# Patient Record
Sex: MALE | Race: WHITE | HISPANIC OR LATINO | Employment: FULL TIME | URBAN - METROPOLITAN AREA
[De-identification: names, ages, dates, MRNs, and addresses within clinical notes are randomized per-mention and may not be internally consistent; named-entity substitution may affect disease eponyms.]

---

## 2019-03-20 ENCOUNTER — OCCUPATIONAL HEALTH (OUTPATIENT)
Dept: URGENT CARE | Facility: CLINIC | Age: 29
End: 2019-03-20

## 2019-03-20 DIAGNOSIS — Z02.83 ENCOUNTER FOR DRUG SCREENING: Primary | ICD-10-CM

## 2019-03-20 LAB
CTP QC/QA: YES
POC 10 PANEL DRUG SCREEN: NORMAL

## 2019-03-20 PROCEDURE — 80305 POCT RAPID DRUG SCREEN 10 PANEL: ICD-10-PCS | Mod: QW,S$GLB,, | Performed by: PHYSICIAN ASSISTANT

## 2019-03-20 PROCEDURE — 80305 DRUG TEST PRSMV DIR OPT OBS: CPT | Mod: QW,S$GLB,, | Performed by: PHYSICIAN ASSISTANT

## 2025-01-08 ENCOUNTER — HOSPITAL ENCOUNTER (EMERGENCY)
Facility: OTHER | Age: 35
Discharge: HOME | End: 2025-01-08
Attending: EMERGENCY MEDICINE

## 2025-01-08 VITALS
SYSTOLIC BLOOD PRESSURE: 154 MMHG | TEMPERATURE: 99 F | HEART RATE: 101 BPM | DIASTOLIC BLOOD PRESSURE: 91 MMHG | OXYGEN SATURATION: 100 % | RESPIRATION RATE: 18 BRPM

## 2025-01-08 DIAGNOSIS — S53.105A CLOSED DISLOCATION OF LEFT ELBOW: ICD-10-CM

## 2025-01-08 DIAGNOSIS — S42.402A ELBOW FRACTURE, LEFT: ICD-10-CM

## 2025-01-08 DIAGNOSIS — R60.9 SWELLING: ICD-10-CM

## 2025-01-08 LAB
ANION GAP SERPL CALC-SCNC: 10 MMOL/L (ref 8–16)
BASOPHILS # BLD AUTO: 0.05 K/UL (ref 0–0.2)
BASOPHILS NFR BLD: 0.5 % (ref 0–1.9)
BUN SERPL-MCNC: 16 MG/DL (ref 6–20)
CALCIUM SERPL-MCNC: 9.9 MG/DL (ref 8.7–10.5)
CHLORIDE SERPL-SCNC: 104 MMOL/L (ref 95–110)
CO2 SERPL-SCNC: 23 MMOL/L (ref 23–29)
CREAT SERPL-MCNC: 1.1 MG/DL (ref 0.5–1.4)
DIFFERENTIAL METHOD BLD: ABNORMAL
EOSINOPHIL # BLD AUTO: 0.3 K/UL (ref 0–0.5)
EOSINOPHIL NFR BLD: 2.9 % (ref 0–8)
ERYTHROCYTE [DISTWIDTH] IN BLOOD BY AUTOMATED COUNT: 12.6 % (ref 11.5–14.5)
EST. GFR  (NO RACE VARIABLE): >60 ML/MIN/1.73 M^2
GLUCOSE SERPL-MCNC: 66 MG/DL (ref 70–110)
HCT VFR BLD AUTO: 44.7 % (ref 40–54)
HGB BLD-MCNC: 15.5 G/DL (ref 14–18)
IMM GRANULOCYTES # BLD AUTO: 0.05 K/UL (ref 0–0.04)
IMM GRANULOCYTES NFR BLD AUTO: 0.5 % (ref 0–0.5)
LYMPHOCYTES # BLD AUTO: 2.9 K/UL (ref 1–4.8)
LYMPHOCYTES NFR BLD: 31.8 % (ref 18–48)
MCH RBC QN AUTO: 31.1 PG (ref 27–31)
MCHC RBC AUTO-ENTMCNC: 34.7 G/DL (ref 32–36)
MCV RBC AUTO: 90 FL (ref 82–98)
MONOCYTES # BLD AUTO: 0.8 K/UL (ref 0.3–1)
MONOCYTES NFR BLD: 8.7 % (ref 4–15)
NEUTROPHILS # BLD AUTO: 5.1 K/UL (ref 1.8–7.7)
NEUTROPHILS NFR BLD: 55.6 % (ref 38–73)
NRBC BLD-RTO: 0 /100 WBC
PLATELET # BLD AUTO: 264 K/UL (ref 150–450)
PMV BLD AUTO: 10 FL (ref 9.2–12.9)
POTASSIUM SERPL-SCNC: 5.3 MMOL/L (ref 3.5–5.1)
RBC # BLD AUTO: 4.99 M/UL (ref 4.6–6.2)
SODIUM SERPL-SCNC: 137 MMOL/L (ref 136–145)
WBC # BLD AUTO: 9.11 K/UL (ref 3.9–12.7)

## 2025-01-08 PROCEDURE — 25000003 PHARM REV CODE 250: Performed by: EMERGENCY MEDICINE

## 2025-01-08 PROCEDURE — 24655 CLTX RDL HEAD/NCK FX W/MNPJ: CPT | Mod: LT

## 2025-01-08 PROCEDURE — 99285 EMERGENCY DEPT VISIT HI MDM: CPT | Mod: 25

## 2025-01-08 PROCEDURE — 96376 TX/PRO/DX INJ SAME DRUG ADON: CPT

## 2025-01-08 PROCEDURE — 85025 COMPLETE CBC W/AUTO DIFF WBC: CPT | Performed by: EMERGENCY MEDICINE

## 2025-01-08 PROCEDURE — 80048 BASIC METABOLIC PNL TOTAL CA: CPT | Performed by: EMERGENCY MEDICINE

## 2025-01-08 PROCEDURE — 96374 THER/PROPH/DIAG INJ IV PUSH: CPT

## 2025-01-08 PROCEDURE — 90715 TDAP VACCINE 7 YRS/> IM: CPT | Performed by: EMERGENCY MEDICINE

## 2025-01-08 PROCEDURE — 63600175 PHARM REV CODE 636 W HCPCS: Performed by: EMERGENCY MEDICINE

## 2025-01-08 PROCEDURE — 90471 IMMUNIZATION ADMIN: CPT | Performed by: EMERGENCY MEDICINE

## 2025-01-08 RX ORDER — ONDANSETRON 4 MG/1
4 TABLET, ORALLY DISINTEGRATING ORAL
Status: COMPLETED | OUTPATIENT
Start: 2025-01-08 | End: 2025-01-08

## 2025-01-08 RX ORDER — FENTANYL CITRATE 50 UG/ML
100 INJECTION, SOLUTION INTRAMUSCULAR; INTRAVENOUS EVERY 30 MIN PRN
Status: DISCONTINUED | OUTPATIENT
Start: 2025-01-08 | End: 2025-01-08

## 2025-01-08 RX ORDER — OXYCODONE AND ACETAMINOPHEN 5; 325 MG/1; MG/1
1 TABLET ORAL EVERY 6 HOURS PRN
Qty: 12 TABLET | Refills: 0 | Status: SHIPPED | OUTPATIENT
Start: 2025-01-08

## 2025-01-08 RX ORDER — OXYCODONE AND ACETAMINOPHEN 5; 325 MG/1; MG/1
2 TABLET ORAL
Status: DISCONTINUED | OUTPATIENT
Start: 2025-01-08 | End: 2025-01-08

## 2025-01-08 RX ORDER — HYDROMORPHONE HYDROCHLORIDE 1 MG/ML
1 INJECTION, SOLUTION INTRAMUSCULAR; INTRAVENOUS; SUBCUTANEOUS
Status: COMPLETED | OUTPATIENT
Start: 2025-01-08 | End: 2025-01-08

## 2025-01-08 RX ORDER — HYDROMORPHONE HYDROCHLORIDE 1 MG/ML
1 INJECTION, SOLUTION INTRAMUSCULAR; INTRAVENOUS; SUBCUTANEOUS
Status: DISCONTINUED | OUTPATIENT
Start: 2025-01-08 | End: 2025-01-08

## 2025-01-08 RX ORDER — MORPHINE SULFATE 4 MG/ML
4 INJECTION, SOLUTION INTRAMUSCULAR; INTRAVENOUS
Status: COMPLETED | OUTPATIENT
Start: 2025-01-08 | End: 2025-01-08

## 2025-01-08 RX ADMIN — HYDROMORPHONE HYDROCHLORIDE 1 MG: 1 INJECTION, SOLUTION INTRAMUSCULAR; INTRAVENOUS; SUBCUTANEOUS at 01:01

## 2025-01-08 RX ADMIN — ONDANSETRON 4 MG: 4 TABLET, ORALLY DISINTEGRATING ORAL at 03:01

## 2025-01-08 RX ADMIN — HYDROMORPHONE HYDROCHLORIDE 1 MG: 1 INJECTION, SOLUTION INTRAMUSCULAR; INTRAVENOUS; SUBCUTANEOUS at 12:01

## 2025-01-08 RX ADMIN — MORPHINE SULFATE 4 MG: 4 INJECTION, SOLUTION INTRAMUSCULAR; INTRAVENOUS at 10:01

## 2025-01-08 RX ADMIN — HYDROMORPHONE HYDROCHLORIDE 1 MG: 1 INJECTION, SOLUTION INTRAMUSCULAR; INTRAVENOUS; SUBCUTANEOUS at 02:01

## 2025-01-08 RX ADMIN — TETANUS TOXOID, REDUCED DIPHTHERIA TOXOID AND ACELLULAR PERTUSSIS VACCINE, ADSORBED 0.5 ML: 5; 2.5; 8; 8; 2.5 SUSPENSION INTRAMUSCULAR at 12:01

## 2025-01-08 NOTE — ED PROVIDER NOTES
Encounter Date: 1/8/2025       History     Chief Complaint   Patient presents with    Arm Injury     Pt complaining of severe pain to L upper arm after falling from ladder pta     This is a 34-year-old man presenting for evaluation of intense pain in the left arm after an episode in which he fell off some scaffolding and landed on the left arm with intense pain immediately thereafter.  He has no other health problems complains of pain no were else, has not take anything to try and help with this pain.  He can not move the arm because of the pain.    The history is provided by the patient, medical records and a relative.     Review of patient's allergies indicates:  No Known Allergies  History reviewed. No pertinent past medical history.  History reviewed. No pertinent surgical history.  No family history on file.  Social History     Tobacco Use    Smoking status: Never    Smokeless tobacco: Never   Substance Use Topics    Drug use: Never     Review of Systems  Constitutional-no fever  HEENT-no congestion  Eyes-no redness  Respiratory-no shortness of breath  Cardio-no chest pain  GI-no abdominal pain  Endocrine-no cold intolerance  -no difficulty urinating  MSK-marked pain in the left arm  Skin-no rashes  Allergy-no environmental allergy  Neurologic-, no headache  Hematology-no swollen nodes  Behavioral-no confusion  Physical Exam     Initial Vitals   BP Pulse Resp Temp SpO2   01/08/25 1028 01/08/25 1028 01/08/25 1028 01/08/25 1120 01/08/25 1028   (!) 223/100 94 (!) 28 99 °F (37.2 °C) 100 %      MAP       --                Physical Exam  Constitutional:  Anxious uncomfortable appearing 34-year-old man in moderate distress  Eyes: Conjunctivae normal.  ENT       Head: Normocephalic, atraumatic.       Nose: Normal external appearance        Mouth/Throat: no strigulous respirations   Hematological/Lymphatic/Immunilogical: no visible lymphadenopathy   Cardiovascular: Normal rate,   Respiratory: Normal respiratory  effort.   Gastrointestinal: non distended   Musculoskeletal:  No cervical midline tenderness   Chest wall stable   Normal range of motion in the right upper extremity, right lower extremity, left lower extremity   The left upper extremity is held in a flexed and internally rotated position, no tenderness at the shoulder   Marked swelling in the left elbow, diminished range of motion   Strong radial pulse symmetric bilaterally   Intact range of motion in the left hand     Neurologic: Alert, oriented. Normal speech and language. No gross focal neurologic deficits are appreciated.  Skin: Skin is warm, dry. No rash noted.  Psychiatric: Mood and affect are normal.   ED Course   Pre-Assessment for Procedural Sedation    Date/Time: 2025 1:31 PM    Performed by: Linden Sinha MD  Authorized by: Linden Sinha MD        ASA Class::  Class 1 - Heathy patient. No medical history.       Mallampati Score::  Class 1 - Visualization of the soft palate, fauces, uvula, and anterior/posterior pillars.    Date/Time of last solid:  2025 11:00 PM    Date/Time of last fluid:  2025 7:00 AM    Patient/Family history of anesthesia or sedation complications: No    Sedation:     Sedation type: moderate (conscious) sedation      Sedation:  Propofol    Analgesia:  Hydromorphone  Orthopedic Injury    Date/Time: 2025 1:31 PM    Performed by: Linden Sinha MD  Authorized by: Linden Sinha MD    Location procedure was performed:  [facility] EMERGENCY DEPARTMENT  Consent Done?:  Yes  Universal Protocol:     Verbal consent obtained?: Yes      Written consent obtained?: No      Consent given by:  Patient    Patient identity confirmed:  , name and MRN  Injury:     Injury location:  Elbow    Location details:  Left elbow    Injury type:  Fracture-dislocation      Pre-procedure assessment:     Neurovascular status: Neurovascularly intact      Distal perfusion: normal      Neurological function: normal       Range of motion: reduced      Local anesthesia used?: No      Patient sedated?: No        Selections made in this section will also lock the Injury type section above.:     Manipulation performed?: Yes      Skin traction used?: Yes      Skeletal traction used?: No      Reduction successful?: Yes      Confirmation: Reduction confirmed by x-ray      Immobilization:  Splint and sling    Splint type: posterior slab.    Supplies used:  Cotton padding, elastic bandage and Ortho-Glass    Complications: Yes (specify) (2 reductions attempted)      Specimens: No      Implants: No    Post-procedure assessment:     Distal perfusion: normal      Neurological function: normal      Range of motion: splinted      Patient tolerance:  Patient tolerated the procedure well with no immediate complications    Labs Reviewed   CBC W/ AUTO DIFFERENTIAL - Abnormal       Result Value    WBC 9.11      RBC 4.99      Hemoglobin 15.5      Hematocrit 44.7      MCV 90      MCH 31.1 (*)     MCHC 34.7      RDW 12.6      Platelets 264      MPV 10.0      Immature Granulocytes 0.5      Gran # (ANC) 5.1      Immature Grans (Abs) 0.05 (*)     Lymph # 2.9      Mono # 0.8      Eos # 0.3      Baso # 0.05      nRBC 0      Gran % 55.6      Lymph % 31.8      Mono % 8.7      Eosinophil % 2.9      Basophil % 0.5      Differential Method Automated     BASIC METABOLIC PANEL - Abnormal    Sodium 137      Potassium 5.3 (*)     Chloride 104      CO2 23      Glucose 66 (*)     BUN 16      Creatinine 1.1      Calcium 9.9      Anion Gap 10      eGFR >60            Imaging Results              X-Ray Elbow Complete Left (Final result)  Result time 01/08/25 15:04:34      Final result by Tiffany Easley MD (01/08/25 15:04:34)                   Impression:      Please see above.      Electronically signed by: Tiffany Easley  Date:    01/08/2025  Time:    15:04               Narrative:    EXAMINATION:  XR ELBOW COMPLETE 3 VIEW LEFT    CLINICAL HISTORY:  Edema,  unspecified    TECHNIQUE:  Lateral radiograph of the elbow    COMPARISON:  Elbow radiographs performed earlier today.    FINDINGS:  Improved alignment of the humerus and ulna.  There remains posterior dislocation of the radial head.    Fracture fragments are again seen as previously described.                                       X-Ray Elbow Complete Left (Final result)  Result time 01/08/25 13:31:09      Final result by Tiffany Easley MD (01/08/25 13:31:09)                   Impression:      Persistent elbow dislocation with multiple fracture fragments.      Electronically signed by: Tiffany Easley  Date:    01/08/2025  Time:    13:31               Narrative:    EXAMINATION:  XR ELBOW COMPLETE 3 VIEW LEFT    CLINICAL HISTORY:  Unspecified dislocation of left ulnohumeral joint, initial encounter    TECHNIQUE:  AP, lateral, and oblique views of the left elbow were performed.    COMPARISON:  01/08/2025    FINDINGS:  Lateral views of the elbow show a persistent dislocation with the proximal radius and ulna located posteriorly relative to the distal humerus.    Fracture fragments are seen, with the fracture involving at least the radial head and likely the distal humerus and at least the coronoid process of the ulna.                                       X-Ray Elbow Complete Left (Final result)  Result time 01/08/25 12:37:49      Final result by Antoni Wayne MD (01/08/25 12:37:49)                   Impression:      Left elbow acute fracture-dislocation complex, as above.      Electronically signed by: Antoni Wayne MD  Date:    01/08/2025  Time:    12:37               Narrative:    EXAMINATION:  XR ELBOW COMPLETE 3 VIEW LEFT    CLINICAL HISTORY:  Edema, unspecified    TECHNIQUE:  Only a single lateral view was submitted for interpretation.    COMPARISON:  None    FINDINGS:  There is fracture-dislocation complex of the elbow, with the forearm dislocated and displaced posteriorly with mild foreshortening with  respect to the distal humerus.  Distal humerus appears impacted upon the humeral head which is fractured.  There are several comminuted and displaced fracture fragments about the elbow, likely arising from the distal humerus, olecranon lip and radial head, allowing for limited evaluation with overlapping of osseous structures.  Associated elbow joint effusion with prominent fat pads.  No subcutaneous emphysema or radiopaque foreign body.                                       X-Ray Shoulder 2 or More Views Left (Final result)  Result time 01/08/25 12:32:00      Final result by nAtoni Wayne MD (01/08/25 12:32:00)                   Impression:      No displaced fracture-dislocation identified.      Electronically signed by: Antoni Wayne MD  Date:    01/08/2025  Time:    12:32               Narrative:    EXAMINATION:  XR SHOULDER COMPLETE 2 OR MORE VIEWS LEFT    CLINICAL HISTORY:  swelling;    TECHNIQUE:  Two or three views of the left shoulder were performed.    COMPARISON:  None    FINDINGS:  Approximate 2 cm cylindrical radiodensity with corresponding curvilinear radiodensity projects over the left chest presumably artifact external to patient.    Bones are well mineralized.  Overall alignment is within normal limits.  No displaced fracture, dislocation or destructive osseous process.  Minimal degenerative change at the AC joint.  No subcutaneous emphysema.  Left lung is well expanded and grossly clear without pneumothorax or sizable pleural effusion.                                    X-Rays:   Independently Interpreted Readings:   Other Readings:  Initial x-ray elbow-fracture dislocation identified in the elbow   Initial postreduction film-persistently dislocated elbow, fracture fragments more prominent   Repeated postreduction film, reduction improved, fragments still in place    Medications   morphine injection 4 mg (4 mg Intravenous Given 1/8/25 9435)   Tdap (BOOSTRIX) vaccine injection 0.5 mL (0.5 mLs  Intramuscular Given 1/8/25 1211)   HYDROmorphone injection 1 mg (1 mg Intravenous Given 1/8/25 1416)   ondansetron disintegrating tablet 4 mg (4 mg Oral Given 1/8/25 1521)     Medical Decision Making  Ddx- dislocation, fracture, fracture dislocation, Polytrauma    Reduction performed, no osseous injuries identified elsewhere, Dr. Bradley presented to assist with reduction.    Plan will be for outpatient follow-up and close proximity given likely high need for surgical fixation.        Problems Addressed:  Closed dislocation of left elbow: acute illness or injury  Elbow fracture, left: acute illness or injury  Swelling: acute illness or injury    Amount and/or Complexity of Data Reviewed  Independent Historian: parent     Details: Mother at bedside notes he is healthy and not allergic  Labs: ordered. Decision-making details documented in ED Course.  Radiology: ordered and independent interpretation performed. Decision-making details documented in ED Course.  Discussion of management or test interpretation with external provider(s): Discussed with Dr. Claude Williams, evaluated the patient at the bedside, performed reduction with splinting.    Plan will be for    Risk  OTC drugs.  Prescription drug management.  Parenteral controlled substances.  Drug therapy requiring intensive monitoring for toxicity.  Decision regarding hospitalization.  Diagnosis or treatment significantly limited by social determinants of health.                                      Clinical Impression:  Final diagnoses:  [R60.9] Swelling  [S53.105A] Closed dislocation of left elbow  [S42.402A] Elbow fracture, left          ED Disposition Condition    Discharge Stable          ED Prescriptions       Medication Sig Dispense Start Date End Date Auth. Provider    oxyCODONE-acetaminophen (PERCOCET) 5-325 mg per tablet Take 1 tablet by mouth every 6 (six) hours as needed for Pain. 12 tablet 1/8/2025 -- Linden Sinha MD          Follow-up  Information       Follow up With Specialties Details Why Contact Info Additional Information    Orthopedics, [facility] - Orthopedic Surgery, Orthopedic Surgery Schedule an appointment as soon as possible for a visit in 1 day If symptoms worsen, For a follow up visit about today [address] [facility] [place] [place] [address]  [phone]       [facility] - Orthoped[facility] 5th Fl Orthopedics Schedule an appointment as soon as possible for a visit  As needed, For a follow up visit about today [address] [facility], 5th Floor  [facility] [address] [phone] [facility] - Main Building, 5th Floor Please park in [facility] and take Atrium elevator             Linden Sinha MD  01/12/25 7407

## 2025-01-08 NOTE — Clinical Note
"Enrique Szymanski"Rich was seen and treated in our emergency department on 1/8/2025.  He may return to work after being cleared by follow-up physician .       If you have any questions or concerns, please don't hesitate to call.      Linden Sinha MD"

## 2025-01-08 NOTE — DISCHARGE INSTRUCTIONS
Mr. Villanueva,    You need to see a surgeon this week for your broken elbow.  It will likely need a surgery to fix.    Thank you for letting me care for you today! It was nice meeting you, and I hope you feel better soon.   If you would like access to your chart and what was done today please utilize the [facility].   Please come back to [facility] for all of your future medical needs.    Our goal in the emergency department is to always give you outstanding care and exceptional service. You may receive a survey by mail or e-mail in the next week regarding your experience in our ED. We would greatly appreciate you completing and returning the survey. Your feedback provides us with a way to recognize our staff who give very good care and it helps us learn how to improve when your experience was below our aspiration of excellence.     Sincerely,    Linden Sinha MD  Board Certified Emergency Physician

## 2025-01-08 NOTE — ED NOTES
Ortho cart and slings placed at bedside per provider request. Pt updated on plan of care and understands the plan.

## 2025-01-10 ENCOUNTER — TELEPHONE (OUTPATIENT)
Dept: URGENT CARE | Facility: CLINIC | Age: 35
End: 2025-01-10

## 2025-01-14 ENCOUNTER — TELEPHONE (OUTPATIENT)
Dept: URGENT CARE | Facility: CLINIC | Age: 35
End: 2025-01-14

## 2025-01-15 ENCOUNTER — TELEPHONE (OUTPATIENT)
Dept: URGENT CARE | Facility: CLINIC | Age: 35
End: 2025-01-15